# Patient Record
(demographics unavailable — no encounter records)

---

## 2018-08-29 PROBLEM — L98.9 SKIN LESION OF NECK: Status: ACTIVE | Noted: 2018-08-29

## 2018-10-02 PROCEDURE — 88305 TISSUE EXAM BY PATHOLOGIST: CPT

## 2018-10-31 PROBLEM — R03.0 ELEVATED BP WITHOUT DIAGNOSIS OF HYPERTENSION: Status: ACTIVE | Noted: 2018-10-31

## 2018-10-31 PROBLEM — C44.41 BASAL CELL CARCINOMA (BCC) OF SKIN OF NECK: Status: ACTIVE | Noted: 2018-10-31

## 2019-08-08 PROBLEM — L98.9 SKIN LESION OF NECK: Status: RESOLVED | Noted: 2018-08-29 | Resolved: 2019-08-08

## 2019-08-08 PROBLEM — I65.21 CAROTID ARTERY STENOSIS, ASYMPTOMATIC, RIGHT: Status: ACTIVE | Noted: 2019-08-08

## 2019-08-08 PROCEDURE — 81001 URINALYSIS AUTO W/SCOPE: CPT | Performed by: FAMILY MEDICINE

## 2022-02-16 PROBLEM — I10 BENIGN ESSENTIAL HYPERTENSION: Status: ACTIVE | Noted: 2022-01-20

## 2022-02-17 PROBLEM — I42.9 IDIOPATHIC CARDIOMYOPATHY (HCC): Status: ACTIVE | Noted: 2022-02-17

## 2022-02-27 RX ORDER — TORSEMIDE 10 MG/1
10 TABLET ORAL DAILY
Qty: 30 TABLET | Refills: 2 | Status: SHIPPED | OUTPATIENT
Start: 2022-02-27

## 2022-02-27 RX ORDER — TORSEMIDE 10 MG/1
TABLET ORAL
Qty: 30 TABLET | Refills: 1 | OUTPATIENT
Start: 2022-02-27

## 2022-02-28 NOTE — TELEPHONE ENCOUNTER
Hi Dr. Scot Peñaloza,  Can you refill torsemide for Betty Giron. He has never been seen at the [address] [place] [address] heart failure clinic.    Thanks,  Emily Dumont

## 2022-10-27 RX ORDER — TORSEMIDE 10 MG/1
TABLET ORAL
Qty: 126 TABLET | Refills: 1 | OUTPATIENT
Start: 2022-10-27